# Patient Record
Sex: FEMALE | Race: WHITE | URBAN - METROPOLITAN AREA
[De-identification: names, ages, dates, MRNs, and addresses within clinical notes are randomized per-mention and may not be internally consistent; named-entity substitution may affect disease eponyms.]

---

## 2017-05-23 ENCOUNTER — APPOINTMENT (RX ONLY)
Dept: URBAN - METROPOLITAN AREA CLINIC 76 | Facility: CLINIC | Age: 32
Setting detail: DERMATOLOGY
End: 2017-05-23

## 2017-05-23 VITALS — HEIGHT: 62 IN | WEIGHT: 115 LBS

## 2017-05-23 DIAGNOSIS — D18.0 HEMANGIOMA: ICD-10-CM

## 2017-05-23 DIAGNOSIS — L81.1 CHLOASMA: ICD-10-CM

## 2017-05-23 DIAGNOSIS — L81.4 OTHER MELANIN HYPERPIGMENTATION: ICD-10-CM

## 2017-05-23 DIAGNOSIS — Z71.89 OTHER SPECIFIED COUNSELING: ICD-10-CM

## 2017-05-23 DIAGNOSIS — Z41.9 ENCOUNTER FOR PROCEDURE FOR PURPOSES OTHER THAN REMEDYING HEALTH STATE, UNSPECIFIED: ICD-10-CM

## 2017-05-23 DIAGNOSIS — D22 MELANOCYTIC NEVI: ICD-10-CM

## 2017-05-23 PROBLEM — D22.5 MELANOCYTIC NEVI OF TRUNK: Status: ACTIVE | Noted: 2017-05-23

## 2017-05-23 PROBLEM — D18.01 HEMANGIOMA OF SKIN AND SUBCUTANEOUS TISSUE: Status: ACTIVE | Noted: 2017-05-23

## 2017-05-23 PROCEDURE — 99203 OFFICE O/P NEW LOW 30 MIN: CPT

## 2017-05-23 PROCEDURE — ? COUNSELING

## 2017-05-23 PROCEDURE — ? IN-HOUSE DISPENSING PHARMACY

## 2017-05-23 PROCEDURE — ? COSMETIC CONSULTATION: BOTULINUM TOXIN

## 2017-05-23 PROCEDURE — ? TREATMENT REGIMEN

## 2017-05-23 ASSESSMENT — LOCATION SIMPLE DESCRIPTION DERM
LOCATION SIMPLE: RIGHT UPPER BACK
LOCATION SIMPLE: ABDOMEN
LOCATION SIMPLE: INFERIOR FOREHEAD

## 2017-05-23 ASSESSMENT — LOCATION ZONE DERM
LOCATION ZONE: FACE
LOCATION ZONE: TRUNK

## 2017-05-23 ASSESSMENT — LOCATION DETAILED DESCRIPTION DERM
LOCATION DETAILED: RIGHT SUPERIOR MEDIAL UPPER BACK
LOCATION DETAILED: INFERIOR MID FOREHEAD
LOCATION DETAILED: EPIGASTRIC SKIN

## 2017-05-23 NOTE — PROCEDURE: IN-HOUSE DISPENSING PHARMACY
Product 67 Amount/Unit (Numbers Only): 0
Product 21 Price/Unit (In Dollars): 50.00
Product 35 Unit Type: mg
Name Of Product 6: Sod Sulfa 10% / Sulf 2% - 644192
Name Of Product 21: Imiqui 5% / Levo 1% Gel - 570949
Product 21 Application Directions: Apply to affected area in the evening or every other evening.
Product 11 Application Directions: Apply to affected area two times a day.
Product 8 Application Directions: Apply to affected area after moisturizer one time a day.
Render Product Pricing In Note: Yes
Name Of Product 14: Dermatitis Topical Foam - 562465
Name Of Product 5: Adap Combo Cream - 156665
Name Of Product 31: Ivermec 1% / Met 1% Gel - 190996
Product 41 Units Dispensed: 1
Product 51 Application Directions: Apply to affected nails daily for 10 months.
Product 41 Application Directions: Apply to hyperpigmented areas in the evening after moisturizer.
Product 13 Price/Unit (In Dollars): 40.00
Name Of Product 8: Taza 0.1 Cream - 850479
Product 1 Application Directions: Use as face or body wash daily.
Name Of Product 51: Anti- Fungal Nail Solution - 954119
Product 5 Application Directions: Apply to affected area before moisturizer one time a day.
Product 2 Application Directions: Apply to affected area in the evening after moisturizer. Avoid eyelids.
Name Of Product 25: Triamcin 0.1% Ointment - 474445
Name Of Product 11: Clob 0.05% Solution - 777930
Name Of Product 41: Hydroquin 6% Combo Cream - 111657
Product 25 Application Directions: Apply to affected area one time a day.
Name Of Product 1: Acne Body Wash - 429653
Name Of Product 4: Clind / Tret Combo Cream - 838497
Name Of Product 7: Acne Gel w/ Dap - 989395
Name Of Product 35: Tacro 0.1% Ointment - 558648
Name Of Product 3: Son / Clind Combo - 142906
Product 3 Application Directions: Apply to acne prone area after moisturizer one time a day.
Detail Level: Zone
Name Of Product 42: Kojic Melasma Cream - 322879
Name Of Product 13: Clob 0.05% Cream - 185484
Name Of Product 12: Iodoquin / Bairon Combo - 417066
Name Of Product 2: Tret 0.05% Cream - 472981

## 2017-05-23 NOTE — HPI: SKIN LESIONS
Is This A New Presentation, Or A Follow-Up?: Skin Lesions
How Severe Is Your Skin Lesion?: moderate
Have Your Skin Lesions Been Treated?: not been treated
Additional History: Pt would also like a cosmetic consultation for her face.